# Patient Record
Sex: MALE | Race: WHITE | NOT HISPANIC OR LATINO | URBAN - METROPOLITAN AREA
[De-identification: names, ages, dates, MRNs, and addresses within clinical notes are randomized per-mention and may not be internally consistent; named-entity substitution may affect disease eponyms.]

---

## 2017-05-17 ENCOUNTER — EMERGENCY (EMERGENCY)
Facility: HOSPITAL | Age: 57
LOS: 1 days | Discharge: HOME | End: 2017-05-17
Attending: EMERGENCY MEDICINE | Admitting: EMERGENCY MEDICINE
Payer: MEDICAID

## 2017-05-17 VITALS
RESPIRATION RATE: 18 BRPM | HEART RATE: 98 BPM | OXYGEN SATURATION: 98 % | TEMPERATURE: 99 F | DIASTOLIC BLOOD PRESSURE: 84 MMHG | SYSTOLIC BLOOD PRESSURE: 122 MMHG
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
## 2017-05-17 DIAGNOSIS — L02.612 CUTANEOUS ABSCESS OF LEFT FOOT: ICD-10-CM

## 2017-05-17 DIAGNOSIS — F17.210 NICOTINE DEPENDENCE, CIGARETTES, UNCOMPLICATED: ICD-10-CM

## 2017-05-17 DIAGNOSIS — S91.312A LACERATION WITHOUT FOREIGN BODY, LEFT FOOT, INITIAL ENCOUNTER: ICD-10-CM

## 2017-05-17 PROCEDURE — 99283 EMERGENCY DEPT VISIT LOW MDM: CPT | Mod: 25

## 2017-05-17 PROCEDURE — 11730 AVULSION NAIL PLATE SIMPLE 1: CPT | Mod: LT

## 2017-05-17 RX ORDER — BACITRACIN ZINC 500 UNIT/G
1 OINTMENT IN PACKET (EA) TOPICAL ONCE
Qty: 0 | Refills: 0 | Status: COMPLETED | OUTPATIENT
Start: 2017-05-17 | End: 2017-05-17

## 2017-05-17 RX ADMIN — Medication 1 APPLICATION(S): at 17:07

## 2017-05-17 NOTE — ED ADULT NURSE NOTE - OBJECTIVE STATEMENT
[place], undomiciled male, unkept, foul odor. pt reports having wound to bottom of L foot for several weeks. denies fever or chills. no n/v/d. md at bedside.

## 2017-05-17 NOTE — ED PROVIDER NOTE - PMH
GERD (gastroesophageal reflux disease)    Hepatitis C virus infection, unspecified chronicity    Rheumatic fever with cardiac involvement

## 2017-05-17 NOTE — ED PROVIDER NOTE - MEDICAL DECISION MAKING DETAILS
left foot laceration from imbedded toenail.  toenail removed.  foot cleansed with betadine.  bacitracin and dressing applied.  to return for signs of infection

## 2017-05-17 NOTE — ED PROVIDER NOTE - NS ED ROS FT
Denies fever, chills, chest pain, palpitations, dyspnea, cough, drainage from lesion or pus, weakness or paralysis, red skin. Admits pain and malodor from "foot abscess"

## 2017-05-17 NOTE — ED PROVIDER NOTE - OBJECTIVE STATEMENT
57 M PMHx significant for Aortic Aneurysm, GERD, Rheumatic Fever presenting with .. 57 M PMHx significant for Aortic Aneurysm, GERD, Rheumatic Fever, HCV, presenting with "foot abscess." He admits progressive pain at medial aspect of L foot where large toe joins the foot over 2 days with associated malodor. Denies drainage or pus. No associated fevers or chills. Never had an abscess before. Admits he is homeless and has been forced to cover it in a plastic bag to keep it clean for last 2 days. Has not taken antibiotics. Otherwise feels 100% himself with no other complaints.

## 2017-05-17 NOTE — ED PROVIDER NOTE - ATTENDING CONTRIBUTION TO CARE
here with clipped toenail imbedded into supeficial dermis of skin on side of left foot.  removed and dressing/ bacitracin applied.  discussed signs of infection, reasons to return.

## 2017-05-17 NOTE — ED PROVIDER NOTE - PHYSICAL EXAMINATION
Peculiar groomed male, appears homeless / living on streets with old dirty clothing, pleasant, appears stated age, in no distress. EOMI PERRLA Sclera clear. MMM. Lungs CTA B/L. Heart RRR. Abdomen Soft NT/ND. Extremities with +++ foreign body embedded in medial aspect of left foot where first toe meets foot. On closer inspection the object is a cleanly cut finger nail which does not appear like it came from the patient's left foot. Nail is easily removed with gloved hand, edges of nail appear to have caused superficial breakage/ulceration of underlying skin. Entire foot is moist due to covering with plastic bag. No pus is expressed from wound and there is not significant surrounding erythema. remainder of foot examination is unremarkable.

## 2017-12-31 ENCOUNTER — EMERGENCY (EMERGENCY)
Facility: HOSPITAL | Age: 57
LOS: 1 days | Discharge: HOME | End: 2017-12-31
Attending: EMERGENCY MEDICINE | Admitting: EMERGENCY MEDICINE
Payer: SELF-PAY

## 2017-12-31 VITALS
OXYGEN SATURATION: 98 % | HEART RATE: 95 BPM | DIASTOLIC BLOOD PRESSURE: 83 MMHG | TEMPERATURE: 98 F | RESPIRATION RATE: 18 BRPM | SYSTOLIC BLOOD PRESSURE: 130 MMHG

## 2017-12-31 DIAGNOSIS — B86 SCABIES: ICD-10-CM

## 2017-12-31 DIAGNOSIS — R21 RASH AND OTHER NONSPECIFIC SKIN ERUPTION: ICD-10-CM

## 2017-12-31 PROCEDURE — 99053 MED SERV 10PM-8AM 24 HR FAC: CPT

## 2017-12-31 PROCEDURE — 99283 EMERGENCY DEPT VISIT LOW MDM: CPT | Mod: 25

## 2017-12-31 RX ORDER — PERMETHRIN CREAM 5% W/W 50 MG/G
1 CREAM TOPICAL ONCE
Qty: 0 | Refills: 0 | Status: COMPLETED | OUTPATIENT
Start: 2017-12-31 | End: 2017-12-31

## 2017-12-31 RX ADMIN — PERMETHRIN CREAM 5% W/W 1 APPLICATION(S): 50 CREAM TOPICAL at 02:00

## 2017-12-31 NOTE — ED PROVIDER NOTE - MEDICAL DECISION MAKING DETAILS
Patient presenting with rash suggestive of scabies, permethrin tx done and new clothes provided. Patient ate and slept in ED.

## 2017-12-31 NOTE — ED PROVIDER NOTE - OBJECTIVE STATEMENT
57 M here requesting new clothes, food and a shower and tx for scabies. He is homeless. No other complaints.

## 2017-12-31 NOTE — ED PROVIDER NOTE - PMH
GERD (gastroesophageal reflux disease)    Hepatitis C virus infection, unspecified chronicity    Homelessness    Rheumatic fever with cardiac involvement

## 2019-01-18 ENCOUNTER — EMERGENCY (EMERGENCY)
Facility: HOSPITAL | Age: 59
LOS: 1 days | Discharge: HOME | End: 2019-01-18
Admitting: EMERGENCY MEDICINE
Payer: MEDICAID

## 2019-01-18 VITALS
SYSTOLIC BLOOD PRESSURE: 122 MMHG | TEMPERATURE: 98 F | DIASTOLIC BLOOD PRESSURE: 71 MMHG | RESPIRATION RATE: 15 BRPM | OXYGEN SATURATION: 96 % | HEART RATE: 98 BPM

## 2019-01-18 DIAGNOSIS — R21 RASH AND OTHER NONSPECIFIC SKIN ERUPTION: ICD-10-CM

## 2019-01-18 DIAGNOSIS — L29.9 PRURITUS, UNSPECIFIED: ICD-10-CM

## 2019-01-18 DIAGNOSIS — F17.200 NICOTINE DEPENDENCE, UNSPECIFIED, UNCOMPLICATED: ICD-10-CM

## 2019-01-18 PROCEDURE — 99283 EMERGENCY DEPT VISIT LOW MDM: CPT

## 2019-01-18 RX ORDER — PERMETHRIN CREAM 5% W/W 50 MG/G
1 CREAM TOPICAL ONCE
Qty: 0 | Refills: 0 | Status: COMPLETED | OUTPATIENT
Start: 2019-01-18 | End: 2019-01-18

## 2019-01-18 RX ADMIN — PERMETHRIN CREAM 5% W/W 1 APPLICATION(S): 50 CREAM TOPICAL at 22:26
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
## 2019-01-18 NOTE — ED PROVIDER NOTE - MEDICAL DECISION MAKING DETAILS
58 y/o M, undomiciled, presents to ED c/o bug bites noted to buttocks.  Pt well appearing. VSS.  Pt given Permethrin topical.  Advised to f/u with primary care.

## 2019-01-18 NOTE — ED PROVIDER NOTE - OBJECTIVE STATEMENT
60 y/o M presents to ED c/o bug bites and itching to his buttocks noted yesterday.  He is homeless and believes he has been exposed to bugs.  He has not visualized any bugs.  He is not sexually active and adamantly denies concern for STI/STDs.  He denies fevers/chills, dysuria, hematuria, penile discharge, rectal pain or drainage.

## 2019-01-18 NOTE — ED ADULT NURSE NOTE - OBJECTIVE STATEMENT
pt aox4. neurologically wnl. no sob or difficulty breathing noted. lung sounds clear bilaterally. skin appropriate for ethnicity, warm and dry. cap refill < 2 secs. pulses 2+ and regular. abd rounded soft and nontender. pt c/o generalized body itching and states that he "has bugs".

## 2019-01-18 NOTE — ED PROVIDER NOTE - NSFOLLOWUPINSTRUCTIONS_ED_ALL_ED_FT
Apply cream as directed.  Follow up with primary care provider [facility]

## 2019-01-19 PROBLEM — B19.20 UNSPECIFIED VIRAL HEPATITIS C WITHOUT HEPATIC COMA: Chronic | Status: ACTIVE | Noted: 2017-05-17

## 2019-01-19 PROBLEM — Z59.0 HOMELESSNESS: Chronic | Status: ACTIVE | Noted: 2017-12-31

## 2019-01-24 ENCOUNTER — EMERGENCY (EMERGENCY)
Facility: HOSPITAL | Age: 59
LOS: 1 days | Discharge: HOME | End: 2019-01-24
Attending: EMERGENCY MEDICINE | Admitting: EMERGENCY MEDICINE
Payer: MEDICAID

## 2019-01-24 VITALS
RESPIRATION RATE: 17 BRPM | DIASTOLIC BLOOD PRESSURE: 86 MMHG | SYSTOLIC BLOOD PRESSURE: 127 MMHG | OXYGEN SATURATION: 94 % | TEMPERATURE: 98 F | HEART RATE: 86 BPM

## 2019-01-24 DIAGNOSIS — Z59.0 HOMELESSNESS: ICD-10-CM

## 2019-01-24 DIAGNOSIS — R05 COUGH: ICD-10-CM

## 2019-01-24 DIAGNOSIS — Z95.0 PRESENCE OF CARDIAC PACEMAKER: ICD-10-CM

## 2019-01-24 PROCEDURE — 71046 X-RAY EXAM CHEST 2 VIEWS: CPT | Mod: 26

## 2019-01-24 PROCEDURE — 99283 EMERGENCY DEPT VISIT LOW MDM: CPT | Mod: 25

## 2019-01-24 RX ORDER — AZITHROMYCIN 500 MG/1
500 TABLET, FILM COATED ORAL ONCE
Qty: 0 | Refills: 0 | Status: COMPLETED | OUTPATIENT
Start: 2019-01-24 | End: 2019-01-24

## 2019-01-24 RX ORDER — AZITHROMYCIN 500 MG/1
1 TABLET, FILM COATED ORAL
Qty: 4 | Refills: 0 | OUTPATIENT
Start: 2019-01-24 | End: 2019-01-27

## 2019-01-24 RX ADMIN — AZITHROMYCIN 500 MILLIGRAM(S): 500 TABLET, FILM COATED ORAL at 02:54

## 2019-01-24 SDOH — ECONOMIC STABILITY - HOUSING INSECURITY: HOMELESSNESS: Z59.0

## 2019-01-24 NOTE — ED PROVIDER NOTE - CONDUCTED A DETAILED DISCUSSION WITH PATIENT AND/OR GUARDIAN REGARDING, MDM
return to ED if symptoms worsen, persist or questions arise/need for outpatient follow-up [Follow-Up Visit] : a follow-up visit for [Other: ____] : [unfilled] [Family Member] : family member [Formal Caregiver] : formal caregiver

## 2019-01-24 NOTE — ED PROVIDER NOTE - PROGRESS NOTE DETAILS
CXR with likely CAP.  Cover with azithromycin.  Conservative management discussed with the patient in detail.  Close PMD follow up encouraged.  Strict ED return instructions discussed in detail and patient given the opportunity to ask any questions about their discharge diagnosis and instructions

## 2019-01-24 NOTE — ED ADULT NURSE NOTE - OBJECTIVE STATEMENT
pt aox4. neurologically wnl. no sob or difficulty breathing noted. lung sounds clear/ diminished bilaterally. pt presents with productive cough x3days. denies any fever or chills. skin appropriate for ethnicity, warm and dry. cap refill < 2 secs. pulses 2+ and regular. abd rounded soft and nontender.

## 2019-01-24 NOTE — ED PROVIDER NOTE - MEDICAL DECISION MAKING DETAILS
Cough x 2-3 days.  Mild rhonchi, no wheezing.  Reviewed vitals.  CXR ordered Cough x 2-3 days.  Mild rhonchi, no wheezing.  Reviewed vitals.  CXR ordered.  No hypoxia or tachypnea.  Speaking in clear, full sentences.  No laboratory testing at this time.

## 2019-01-24 NOTE — ED PROVIDER NOTE - CONSTITUTIONAL, MLM
normal... unkempt, awake, alert, oriented to person, place, time/situation and in no apparent distress.

## 2019-01-24 NOTE — ED PROVIDER NOTE - OBJECTIVE STATEMENT
Homeless, male smoker presents with cough x 3 days.  Denies HIV or asthma/copd.  Requesting cough medication, food and water.  No fever or chills.  Denies recent hospitalization.  Multiple ED visits for a myriad of complaints.

## 2019-01-24 NOTE — ED PROVIDER NOTE - DIAGNOSTIC INTERPRETATION
ER Physician: Yan [place]  CHEST XRAY INTERPRETATION: possible pna, heart shadow normal, bony structures intact

## 2019-03-05 ENCOUNTER — EMERGENCY (EMERGENCY)
Facility: HOSPITAL | Age: 59
LOS: 1 days | Discharge: HOME | End: 2019-03-05
Admitting: EMERGENCY MEDICINE
Payer: SELF-PAY

## 2019-03-05 VITALS
RESPIRATION RATE: 16 BRPM | HEART RATE: 81 BPM | OXYGEN SATURATION: 99 % | SYSTOLIC BLOOD PRESSURE: 147 MMHG | TEMPERATURE: 98 F | WEIGHT: 139.99 LBS | DIASTOLIC BLOOD PRESSURE: 75 MMHG

## 2019-03-05 PROCEDURE — 99283 EMERGENCY DEPT VISIT LOW MDM: CPT

## 2019-03-05 RX ORDER — PERMETHRIN CREAM 5% W/W 50 MG/G
1 CREAM TOPICAL ONCE
Qty: 0 | Refills: 0 | Status: COMPLETED | OUTPATIENT
Start: 2019-03-05 | End: 2019-03-05

## 2019-03-05 RX ADMIN — PERMETHRIN CREAM 5% W/W 1 APPLICATION(S): 50 CREAM TOPICAL at 17:40

## 2019-03-05 NOTE — ED PROVIDER NOTE - OBJECTIVE STATEMENT
58 y/o male with PMHx of GERD and Hep C presents to the ED with complaints of lice x 2 days. Pt states he has "bugs around his body" and is c/o itchiness. He is undomiciled and living in a shelter on and off. Pt is requesting for new clothes and food. No other complaints. No fever, no chills, no vomiting.

## 2019-03-05 NOTE — ED PROVIDER NOTE - CLINICAL SUMMARY MEDICAL DECISION MAKING FREE TEXT BOX
58 y/o M presents to ED requesting treatment for "bugs".  Pt has scabs in linear distribution suggestive of scabies.  Will treat with permethrin and discharge.

## 2019-03-08 DIAGNOSIS — L29.9 PRURITUS, UNSPECIFIED: ICD-10-CM

## 2019-03-08 DIAGNOSIS — Z79.2 LONG TERM (CURRENT) USE OF ANTIBIOTICS: ICD-10-CM

## 2019-04-09 ENCOUNTER — EMERGENCY (EMERGENCY)
Facility: HOSPITAL | Age: 59
LOS: 1 days | Discharge: HOME | End: 2019-04-09
Attending: EMERGENCY MEDICINE | Admitting: EMERGENCY MEDICINE
Payer: SELF-PAY

## 2019-04-09 VITALS
TEMPERATURE: 98 F | OXYGEN SATURATION: 96 % | SYSTOLIC BLOOD PRESSURE: 151 MMHG | RESPIRATION RATE: 16 BRPM | DIASTOLIC BLOOD PRESSURE: 72 MMHG | HEART RATE: 74 BPM

## 2019-04-09 VITALS
HEART RATE: 77 BPM | TEMPERATURE: 98 F | SYSTOLIC BLOOD PRESSURE: 167 MMHG | OXYGEN SATURATION: 95 % | RESPIRATION RATE: 16 BRPM | DIASTOLIC BLOOD PRESSURE: 84 MMHG

## 2019-04-09 PROCEDURE — 99283 EMERGENCY DEPT VISIT LOW MDM: CPT

## 2019-04-09 RX ORDER — PERMETHRIN CREAM 5% W/W 50 MG/G
1 CREAM TOPICAL ONCE
Qty: 0 | Refills: 0 | Status: COMPLETED | OUTPATIENT
Start: 2019-04-09 | End: 2019-04-09

## 2019-04-09 RX ORDER — DIPHENHYDRAMINE HCL 50 MG
25 CAPSULE ORAL ONCE
Qty: 0 | Refills: 0 | Status: COMPLETED | OUTPATIENT
Start: 2019-04-09 | End: 2019-04-09

## 2019-04-09 RX ADMIN — Medication 25 MILLIGRAM(S): at 13:28

## 2019-04-09 RX ADMIN — PERMETHRIN CREAM 5% W/W 1 APPLICATION(S): 50 CREAM TOPICAL at 13:28

## 2019-04-09 NOTE — ED PROVIDER NOTE - SKIN, MLM
Excoriated dry rash diffusely in upper and lower extremities, trunk, and abdomen with no areas of induration or fluctuance.

## 2019-04-09 NOTE — ED PROVIDER NOTE - OBJECTIVE STATEMENT
58 y/o Male with a PMHx of GERD and Hep C presents to the ED c/o itching to the body for the past 2 days and attributes it to bed bugs. He had a similar episode a few weeks ago. NKDA. Pt is also requesting for food and clothes.

## 2019-04-09 NOTE — ED ADULT NURSE NOTE - CHPI ED NUR SYMPTOMS NEG
no pain/no vomiting/no confusion/no decreased eating/drinking/no chills/no petechia/no fever/no body aches/no inflammation

## 2019-04-09 NOTE — ED ADULT NURSE NOTE - NSIMPLEMENTINTERV_GEN_ALL_ED
Implemented All Universal Safety Interventions:  [place] to call system. Call bell, personal items and telephone within reach. Instruct patient to call for assistance. Room bathroom lighting operational. Non-slip footwear when patient is off stretcher. Physically safe environment: no spills, clutter or unnecessary equipment. Stretcher in lowest position, wheels locked, appropriate side rails in place.

## 2019-04-13 DIAGNOSIS — L29.9 PRURITUS, UNSPECIFIED: ICD-10-CM

## 2019-04-13 DIAGNOSIS — Z79.2 LONG TERM (CURRENT) USE OF ANTIBIOTICS: ICD-10-CM

## 2019-04-13 DIAGNOSIS — R21 RASH AND OTHER NONSPECIFIC SKIN ERUPTION: ICD-10-CM

## 2020-03-19 ENCOUNTER — EMERGENCY (EMERGENCY)
Facility: HOSPITAL | Age: 60
LOS: 1 days | Discharge: HOME | End: 2020-03-19
Admitting: EMERGENCY MEDICINE
Payer: SELF-PAY

## 2020-03-19 VITALS
HEIGHT: 66 IN | TEMPERATURE: 98 F | WEIGHT: 130.07 LBS | OXYGEN SATURATION: 99 % | DIASTOLIC BLOOD PRESSURE: 110 MMHG | HEART RATE: 79 BPM | RESPIRATION RATE: 15 BRPM | SYSTOLIC BLOOD PRESSURE: 180 MMHG

## 2020-03-19 VITALS
HEART RATE: 73 BPM | DIASTOLIC BLOOD PRESSURE: 89 MMHG | RESPIRATION RATE: 18 BRPM | OXYGEN SATURATION: 97 % | SYSTOLIC BLOOD PRESSURE: 138 MMHG

## 2020-03-19 DIAGNOSIS — L29.9 PRURITUS, UNSPECIFIED: ICD-10-CM

## 2020-03-19 DIAGNOSIS — B85.2 PEDICULOSIS, UNSPECIFIED: ICD-10-CM

## 2020-03-19 DIAGNOSIS — Z79.2 LONG TERM (CURRENT) USE OF ANTIBIOTICS: ICD-10-CM

## 2020-03-19 DIAGNOSIS — F17.200 NICOTINE DEPENDENCE, UNSPECIFIED, UNCOMPLICATED: ICD-10-CM

## 2020-03-19 PROCEDURE — 99283 EMERGENCY DEPT VISIT LOW MDM: CPT

## 2020-03-19 RX ORDER — PERMETHRIN CREAM 5% W/W 50 MG/G
1 CREAM TOPICAL ONCE
Refills: 0 | Status: COMPLETED | OUTPATIENT
Start: 2020-03-19 | End: 2020-03-19

## 2020-03-19 RX ORDER — HYDROXYZINE HCL 10 MG
50 TABLET ORAL ONCE
Refills: 0 | Status: COMPLETED | OUTPATIENT
Start: 2020-03-19 | End: 2020-03-19

## 2020-03-19 RX ADMIN — PERMETHRIN CREAM 5% W/W 1 APPLICATION(S): 50 CREAM TOPICAL at 21:07

## 2020-03-19 RX ADMIN — PERMETHRIN CREAM 5% W/W 1 APPLICATION(S): 50 CREAM TOPICAL at 21:08

## 2020-03-19 RX ADMIN — Medication 50 MILLIGRAM(S): at 20:56

## 2020-03-19 NOTE — ED PROVIDER NOTE - PATIENT PORTAL LINK FT
You can access the FollowMyHealth Patient Portal offered by [facility] by registering at the following website: [email or web address]. By joining [employer]’s FollowMyHealth portal, you will also be able to view your health information using other applications (apps) compatible with our system.

## 2020-03-19 NOTE — ED PROVIDER NOTE - CLINICAL SUMMARY MEDICAL DECISION MAKING FREE TEXT BOX
pt p/w generalized itchy rash x 2d, noted excoriation but no s/s of superimposed bacterial infection, exam suggestive of lice infestation, given permethrin and atarax, clean clothes and instruction provided, medically stable for dc

## 2020-03-19 NOTE — ED PROVIDER NOTE - PHYSICAL EXAMINATION
Gen - Unkempt M, NAD, comfortable and non-toxic appearing  Skin - warm, dry, pruritic balanchable NT excoriated rash to the extremities and minimally involving the trunk, no desquamation of the skin, d/c, fluctuance, streaking, or warmth   HEENT - AT/NC, airway patent, neck supple   CV - S1S2, R/R/R  Resp - CTAB, no r/r/w  GI - soft, ND, NT, no CVAT b/l   MS - w/w/p, no c/c/e  Neuro - AxOx3, ambulatory without gait disturbance

## 2020-03-19 NOTE — ED PROVIDER NOTE - OBJECTIVE STATEMENT
61 yo M with PMHx of Hep C, GERD, undomiciled, presenting c/o generalized itchy rash x 2d.  Pt reports having some rash to the extremities with associated itching x 2d.  Reports possible bugs noted today.  Denies fever, chills, SOB, CP, palpitations, stridors, tongue/lip swelling, focal weakness, HA, dizziness, N/V/D/C, oral/genital lesions, cough, paresthesia, numbness, tingling, malaise, and diaphoresis.

## 2020-05-10 ENCOUNTER — EMERGENCY (EMERGENCY)
Facility: HOSPITAL | Age: 60
LOS: 1 days | Discharge: HOME | End: 2020-05-10
Admitting: EMERGENCY MEDICINE
Payer: SELF-PAY

## 2020-05-10 VITALS
SYSTOLIC BLOOD PRESSURE: 138 MMHG | TEMPERATURE: 98 F | RESPIRATION RATE: 16 BRPM | DIASTOLIC BLOOD PRESSURE: 69 MMHG | OXYGEN SATURATION: 97 % | HEART RATE: 73 BPM | HEIGHT: 64 IN | WEIGHT: 130.07 LBS

## 2020-05-10 PROCEDURE — 99283 EMERGENCY DEPT VISIT LOW MDM: CPT

## 2020-05-10 PROCEDURE — 99053 MED SERV 10PM-8AM 24 HR FAC: CPT

## 2020-05-10 RX ORDER — PERMETHRIN CREAM 5% W/W 50 MG/G
1 CREAM TOPICAL ONCE
Refills: 0 | Status: COMPLETED | OUTPATIENT
Start: 2020-05-10 | End: 2020-05-10

## 2020-05-10 RX ADMIN — PERMETHRIN CREAM 5% W/W 1 APPLICATION(S): 50 CREAM TOPICAL at 02:34

## 2020-05-10 NOTE — ED ADULT NURSE NOTE - CHPI ED NUR SYMPTOMS NEG
no vomiting/no decreased eating/drinking/no dizziness/no fever/no nausea/no chills/no weakness/no pain

## 2020-05-10 NOTE — ED PROVIDER NOTE - PATIENT PORTAL LINK FT
You can access the FollowMyHealth Patient Portal offered by [facility] by registering at the following website: [email or web address]. By joining [employer]’s FollowMyHealth portal, you will also be able to view your health information using other applications (apps) compatible with our system.

## 2020-05-10 NOTE — ED PROVIDER NOTE - PHYSICAL EXAMINATION
CONSTITUTIONAL: Well-developed; well-nourished; in no acute distress.  	SKIN: +excoriations from scratching   	HEAD: Normocephalic; atraumatic.  	EYES: clear bilaterally  	NEURO: Alert, oriented. Grossly unremarkable.  PSYCH: Cooperative, appropriate.

## 2020-05-10 NOTE — ED PROVIDER NOTE - OBJECTIVE STATEMENT
59 yo M hx Hep C, GERD, undomiciled, presenting c/o generalized body itching - thinks he may have lice infestation as he saw bugs crawl on him. sleeps on the street

## 2020-05-10 NOTE — ED PROVIDER NOTE - NSFOLLOWUPINSTRUCTIONS_ED_ALL_ED_FT
Follow up with primary care provider    Return to the Emergency Department for worsening or new symptoms.

## 2020-05-10 NOTE — ED ADULT TRIAGE NOTE - CHIEF COMPLAINT QUOTE
"I have bugs and im itching" pt homeless , sleeping rough, states has been itching for days nil else unkempt

## 2020-05-14 DIAGNOSIS — L29.9 PRURITUS, UNSPECIFIED: ICD-10-CM

## 2020-12-19 ENCOUNTER — EMERGENCY (EMERGENCY)
Facility: HOSPITAL | Age: 60
LOS: 1 days | Discharge: HOME | End: 2020-12-19
Attending: EMERGENCY MEDICINE | Admitting: EMERGENCY MEDICINE
Payer: SELF-PAY

## 2020-12-19 VITALS
WEIGHT: 130.07 LBS | OXYGEN SATURATION: 94 % | TEMPERATURE: 98 F | HEART RATE: 79 BPM | RESPIRATION RATE: 18 BRPM | DIASTOLIC BLOOD PRESSURE: 86 MMHG | SYSTOLIC BLOOD PRESSURE: 151 MMHG | HEIGHT: 64 IN

## 2020-12-19 DIAGNOSIS — L29.9 PRURITUS, UNSPECIFIED: ICD-10-CM

## 2020-12-19 PROCEDURE — 99053 MED SERV 10PM-8AM 24 HR FAC: CPT

## 2020-12-19 PROCEDURE — 99283 EMERGENCY DEPT VISIT LOW MDM: CPT

## 2020-12-19 RX ORDER — PERMETHRIN CREAM 5% W/W 50 MG/G
1 CREAM TOPICAL ONCE
Refills: 0 | Status: COMPLETED | OUTPATIENT
Start: 2020-12-19 | End: 2020-12-19

## 2020-12-19 RX ADMIN — PERMETHRIN CREAM 5% W/W 1 APPLICATION(S): 50 CREAM TOPICAL at 03:43

## 2020-12-19 NOTE — ED PROVIDER NOTE - PATIENT PORTAL LINK FT
You can access the FollowMyHealth Patient Portal offered by [facility] by registering at the following website: [email or web address]. By joining [employer]’s FollowMyHealth portal, you will also be able to view your health information using other applications (apps) compatible with our system.

## 2020-12-19 NOTE — ED PROVIDER NOTE - NSFOLLOWUPINSTRUCTIONS_ED_ALL_ED_FT
Please rinse of the lotion that you applied in 12-18 hours.     Please use the body rinse/shampoo at that time.    Return o the ER for medical emergencies.

## 2020-12-19 NOTE — ED PROVIDER NOTE - CLINICAL SUMMARY MEDICAL DECISION MAKING FREE TEXT BOX
Patient presenting with dry itchy skin. Concerned for body lice. will give permethrin. new clothes. D/C at [facility].

## 2020-12-19 NOTE — ED ADULT TRIAGE NOTE - CHIEF COMPLAINT QUOTE
pt homeless sleeping rough, states has itchy red skin, showed me his arms, raised red bumps, pt pleasant, gcs 15 steady gait pt homeless sleeping on the streets, states has itchy red skin, showed me his arms, raised red bumps, pt pleasant, gcs 15 steady gait

## 2020-12-19 NOTE — ED ADULT NURSE NOTE - CHIEF COMPLAINT QUOTE
pt homeless sleeping on the streets, states has itchy red skin, showed me his arms, raised red bumps, pt pleasant, gcs 15 steady gait

## 2020-12-19 NOTE — ED ADULT NURSE NOTE - NSIMPLEMENTINTERV_GEN_ALL_ED
Implemented All Universal Safety Interventions:  [place] to call system. Call bell, personal items and telephone within reach. Instruct patient to call for assistance. Room bathroom lighting operational. Non-slip footwear when patient is off stretcher. Physically safe environment: no spills, clutter or unnecessary equipment. Stretcher in lowest position, wheels locked, appropriate side rails in place.

## 2020-12-19 NOTE — ED ADULT NURSE REASSESSMENT NOTE - NS ED NURSE REASSESS COMMENT FT1
Rcvd pt from [facility]. Pt sleeping in chair. Breathing spontaneous and unlabored. Pending AM discharge.

## 2020-12-19 NOTE — ED PROVIDER NOTE - SKIN, MLM
Skin normal color for race, warm, dry and intact. No evidence of rash. No visible bites, annalee or bugs. + dry skin.

## 2020-12-19 NOTE — ED PROVIDER NOTE - OBJECTIVE STATEMENT
60 M presenting with itching- he is concerned that he may have picked up lice at a shelter he has a shelter that he can go to in the am. No other complaints. Needs new clothes/foods. No visible bugs. No substance use.

## 2021-03-11 ENCOUNTER — EMERGENCY (EMERGENCY)
Facility: HOSPITAL | Age: 61
LOS: 1 days | Discharge: HOME | End: 2021-03-11
Admitting: EMERGENCY MEDICINE
Payer: MEDICAID

## 2021-03-11 VITALS
HEIGHT: 64 IN | RESPIRATION RATE: 18 BRPM | DIASTOLIC BLOOD PRESSURE: 67 MMHG | OXYGEN SATURATION: 97 % | TEMPERATURE: 98 F | SYSTOLIC BLOOD PRESSURE: 145 MMHG | HEART RATE: 88 BPM

## 2021-03-11 DIAGNOSIS — L29.9 PRURITUS, UNSPECIFIED: ICD-10-CM

## 2021-03-11 DIAGNOSIS — Z79.2 LONG TERM (CURRENT) USE OF ANTIBIOTICS: ICD-10-CM

## 2021-03-11 PROCEDURE — 99282 EMERGENCY DEPT VISIT SF MDM: CPT

## 2021-03-11 RX ORDER — PERMETHRIN CREAM 5% W/W 50 MG/G
1 CREAM TOPICAL ONCE
Refills: 0 | Status: COMPLETED | OUTPATIENT
Start: 2021-03-11 | End: 2021-03-11

## 2021-03-11 RX ADMIN — PERMETHRIN CREAM 5% W/W 1 APPLICATION(S): 50 CREAM TOPICAL at 03:22

## 2021-03-11 NOTE — ED PROVIDER NOTE - NS ED MD EM SELECTION
- Continue with ASA     - Daily LDH   - Continue with AC therapy on coumadin for a therapeutic INR goal 2-3    - D/C plan Rehab  when ins auth received  hopeful  monday [address] Exp Problem Focused - Low Complex

## 2021-03-11 NOTE — ED ADULT TRIAGE NOTE - CHIEF COMPLAINT QUOTE
walk in pt with complaints of itching to arms and torso, pt states he saw a bug on him two days ago that looked like a poppy seed.

## 2021-03-11 NOTE — ED ADULT NURSE NOTE - FINAL NURSING ELECTRONIC SIGNATURE
Low-fat diet (to less than 25 to 40 g daily) and remember that even good fat (nuts, and vegetable oils) can increase the TG level.      Avoid alcohol overuse.    Avoid taking any biotin-containing product at least 1 week prior to each thyroid lab.     Consider screening for hypertriglyceridemia in first degree relatives.    Please have fasting labs done in ~1 week and again in ~3 months (few days prior to next visit).    Also start over the counter Vitamin D 2000 International Units daily once done with 50,000 International Units weekly.   11-Mar-2021 03:28

## 2021-03-11 NOTE — ED PROVIDER NOTE - PHYSICAL EXAMINATION
CONSTITUTIONAL: Well-appearing; well-nourished; in no apparent distress.   	HEAD: Normocephalic; atraumatic.  no visible bugs  	EYES:  clear bilaterally  ENT: airway patent  Resp breathing comfortably with no distress  PSYCHOLOGICAL: The patient’s mood and manner are appropriate.

## 2021-03-11 NOTE — ED PROVIDER NOTE - PATIENT PORTAL LINK FT
You can access the FollowMyHealth Patient Portal offered by [facility] by registering at the following website: [email or web address]. By joining [employer]’s FollowMyHealth portal, you will also be able to view your health information using other applications (apps) compatible with our system.

## 2021-03-11 NOTE — ED PROVIDER NOTE - OBJECTIVE STATEMENT
c/o itching to bilateral arms, thinks he saw lice earlier today, asking for permethrin cream, new clothes and food.

## 2021-04-07 ENCOUNTER — EMERGENCY (EMERGENCY)
Facility: HOSPITAL | Age: 61
LOS: 1 days | Discharge: LEFT BEFORE DISCHARGE | End: 2021-04-07
Admitting: EMERGENCY MEDICINE
Payer: MEDICAID

## 2021-04-07 VITALS
TEMPERATURE: 98 F | OXYGEN SATURATION: 98 % | HEART RATE: 77 BPM | SYSTOLIC BLOOD PRESSURE: 145 MMHG | HEIGHT: 64 IN | RESPIRATION RATE: 18 BRPM | DIASTOLIC BLOOD PRESSURE: 64 MMHG

## 2021-04-07 DIAGNOSIS — R21 RASH AND OTHER NONSPECIFIC SKIN ERUPTION: ICD-10-CM

## 2021-04-07 DIAGNOSIS — Z53.21 PROCEDURE AND TREATMENT NOT CARRIED OUT DUE TO PATIENT LEAVING PRIOR TO BEING SEEN BY HEALTH CARE PROVIDER: ICD-10-CM

## 2021-04-07 PROCEDURE — L9993: CPT

## 2021-04-07 NOTE — ED ADULT TRIAGE NOTE - CHIEF COMPLAINT QUOTE
walk in pt with complaints of itching and scabs to arms. States 'I have bugs.' Requesting ointment or lotion.

## 2022-05-10 NOTE — ED ADULT NURSE NOTE - PRO INTERPRETER NEED 2
English Modify Regimen: Increase Isotretinoin 30mg bid to 40mg po bid with a fatty meal Otc Regimen: Recommend saline nasal spray and moisturizing with nose and lips with Aquaphor/ Vaseline Detail Level: Zone

## 2022-11-16 NOTE — ED PROVIDER NOTE - NSTIMEPROVIDERCAREINITIATE_GEN_ER
[FreeTextEntry1] : Omari returns today for follow up re dyspnea, elevated blood pressure readings at home and LE edema. \par \par As he returns today, he is reasonably well overall.  He continues to twice daily hydralazine and clonidine.  At home, he tends to get variable readings, but here his blood pressures has been improved; today in the office, his pressure is a reasonable 138/74 mm Hg.  He also continues on carvedilol 25 mg. \par \par For his BP and chronic lower extremity edema, he is currently on furosemide 60 mg once daily (we had recommended 80 mg. qd previously, but he reports intolerance of the higher dose due to urinary frequency).   Today, he again describes dysuria. Recent urinalysis confirmed E.coli UTI for which Bactrim was prescribed; he had initial relief of dysuria, but this has recurred. \par \par He describes no recurrence of palpitations.  He reports no exertional chest discomfort. There have been no episode of orthopnea or PND.  \par \par He continues on Xarelto & reports no bleeding episodes.
17-May-2017 16:18

## 2023-09-18 NOTE — ED ADULT NURSE NOTE - NS_NURSE_DISC_TEACHING_YN_ED_ALL_ED
Medication: tamsulosin (FLOMAX) 0.4 MG Cap  Last office visit date: 4/4/23  Next appointment scheduled?: No; follow up in 1 year   Number of refills given: 0    
Yes

## 2024-05-07 NOTE — ED ADULT TRIAGE NOTE - MODE OF ARRIVAL
Render In Strict Bullet Format?: No Continue Regimen: Continue ketoconazole shampoo once a week for maintenance. Use desonide as needed for flares. Detail Level: Zone Initiate Treatment: Pt present with green discharge in right eye. He reports itchy watery eyes for past few months. Ciprofloxacin 0.3 eye drops. Apply to right eye every 4 hours x 7 days. Pt instructed to follow up with an eye doctor asap. Pt verbalized understanding and agrees to treatment plan. Walk in

## 2025-01-31 NOTE — ED ADULT TRIAGE NOTE - LOCATION:
General Patient Message:  adult faint and fall clinic doesn't have anyone who specializes in pt disorder. Its okay to Try [facility]   Caller Information       Contact Date/Time Type Contact Phone/Fax    01/29/2025 02:17 PM CST Phone (Incoming) ruy [phone]      peds cardiology            Alternative phone number: option 2    Can a detailed message be left? No  Patient has been advised the message will be addressed within 2-3 business days.              Copied from CRM #[id number]. Topic: MW Messaging - MW Patient Request  >> Jan 29, 2025  2:19 PM Stoney BARKER wrote:  ruy called requesting to send a general message to clinician.   Verified issue is NOT regarding a symptom(s) requiring routine or emergent triage. Verified another message template type and CRM does not apply.    Selected 'Wrap Up CRM' and created new Telephone Encounter after clicking 'Convert to Clinical Call'. Selected appropriate Reason for Call.  Sent Pt message template and routed as routine priority per Clinician KB page to appropriate clinician pool. Readback full message.  
Informed Donnell that new referral will be placed for [facility]- Autonomic Disorders Treatment clinic.    Referral faxed to [phone]  
Need to contact [facility] [phone] to get a fax number for referral.  Closed for the day will call the autonomic disorder clinic on Thursday when returning to the office and place referral.  Then will call Donnell to advise of change in referral  
Left arm;

## 2025-05-14 NOTE — ED ADULT NURSE NOTE - CHIEF COMPLAINT QUOTE
"I have bugs and im itching" pt homeless , sleeping rough, states has been itching for days nil else unkempt
0